# Patient Record
Sex: FEMALE | Race: WHITE | Employment: FULL TIME | URBAN - METROPOLITAN AREA
[De-identification: names, ages, dates, MRNs, and addresses within clinical notes are randomized per-mention and may not be internally consistent; named-entity substitution may affect disease eponyms.]

---

## 2017-01-02 ENCOUNTER — OFFICE VISIT (OUTPATIENT)
Dept: FAMILY MEDICINE CLINIC | Age: 57
End: 2017-01-02

## 2017-01-02 VITALS
TEMPERATURE: 99 F | DIASTOLIC BLOOD PRESSURE: 64 MMHG | SYSTOLIC BLOOD PRESSURE: 106 MMHG | OXYGEN SATURATION: 99 % | RESPIRATION RATE: 12 BRPM | WEIGHT: 117 LBS | HEIGHT: 60 IN | HEART RATE: 90 BPM | BODY MASS INDEX: 22.97 KG/M2

## 2017-01-02 DIAGNOSIS — J01.90 ACUTE SINUSITIS, RECURRENCE NOT SPECIFIED, UNSPECIFIED LOCATION: Primary | ICD-10-CM

## 2017-01-02 RX ORDER — DOXYCYCLINE 100 MG/1
100 CAPSULE ORAL 2 TIMES DAILY
Qty: 20 CAP | Refills: 0 | Status: SHIPPED | OUTPATIENT
Start: 2017-01-02 | End: 2017-01-12

## 2017-01-02 NOTE — MR AVS SNAPSHOT
Visit Information Date & Time Provider Department Dept. Phone Encounter #  
 1/2/2017 12:15 PM FAST 31 [facility] [place] [phone] [id number] Upcoming Health Maintenance Date Due Hepatitis C Screening 1960 COLONOSCOPY 10/14/1978 DTaP/Tdap/Td series (1 - Tdap) 10/14/1981 PAP AKA CERVICAL CYTOLOGY 10/14/1981 BREAST CANCER SCRN MAMMOGRAM 10/14/2010 Allergies as of 1/2/2017  Review Complete On: 1/2/2017 By: Garrison Nichols NP Severity Noted Reaction Type Reactions Ciprofloxacin  06/24/2011    Rash Iodinated Contrast Media - Oral And Iv Dye  03/25/2016    Hives Macrobid [Nitrofurantoin Monohyd/m-cryst]  01/08/2013    Other (comments) Nexium [Esomeprazole Magnesium]  10/07/2016    Other (comments) Urinary discomfort Nsaids (Non-steroidal Anti-inflammatory Drug)  01/08/2013    Other (comments) Patient states no allergy just not to take them due to elevated LFT's  
 Penicillins  10/04/2010    Anaphylaxis Skelaxin [Metaxalone]  10/04/2010    Hives Sulfa (Sulfonamide Antibiotics)  10/04/2010    Other (comments) Ultram [Tramadol]  01/08/2013    Rash Current Immunizations  Reviewed on 10/7/2016 Name Date Influenza Vaccine 10/4/2013, 1/18/2013 Influenza Vaccine (Quad) PF 10/7/2016  4:37 PM  
 Pneumococcal Conjugate (PCV-13) 10/7/2016  4:37 PM  
 Pneumococcal Polysaccharide (PPSV-23) 10/4/2013 Not reviewed this visit You Were Diagnosed With   
  
 Codes Comments Acute sinusitis, recurrence not specified, unspecified location    -  Primary ICD-10-CM: J01.90 ICD-9-CM: 461.9 Vitals BP Pulse Temp Resp Height(growth percentile) Weight(growth percentile) 106/64 90 99 °F (37.2 °C) (Oral) 12 5' (1.524 m) 117 lb (53.1 kg) SpO2 BMI OB Status Smoking Status 99% 22.85 kg/m2 Hysterectomy Current Every Day Smoker BMI and BSA Data Body Mass Index Body Surface Area 22.85 kg/m 2 1.5 m 2 Preferred Pharmacy Pharmacy Name Phone FARM [facility] [address] [facility], [address] [place] [address] [phone] Your Updated Medication List  
  
   
This list is accurate as of: 1/2/17 12:40 PM.  Always use your most recent med list.  
  
  
  
  
 albuterol 90 mcg/actuation inhaler Commonly known as:  PROVENTIL HFA, VENTOLIN HFA, PROAIR HFA Take 1 Puff by inhalation every four (4) hours as needed for Wheezing. buPROPion  mg tablet Commonly known as:  Ritika Bannister Take 1 Tab by mouth every morning. clobetasol 0.05 % topical cream  
Commonly known as:  Alfornia Hussar Apply  to affected area two (2) times a day. doxycycline 100 mg capsule Commonly known as:  VIBRAMYCIN Take 1 Cap by mouth two (2) times a day for 10 days. estradiol 1.5 mg tablet Commonly known as:  ESTRACE Take  by mouth daily. guaiFENesin-codeine 100-10 mg/5 mL solution Commonly known as:  CHERATUSSIN AC  
5-10 mL every 6 hours if needed for cough. HYDROcodone-acetaminophen 7.5-325 mg per tablet Commonly known as:  Lynnetta Ratliff Take 1 Tab by mouth every eight (8) hours as needed for Pain.  
  
 lovastatin 20 mg tablet Commonly known as:  MEVACOR  
take 1 tablet by mouth once daily  
  
 metoclopramide HCl 5 mg tablet Commonly known as:  REGLAN Take 1 Tab by mouth four (4) times daily. MILK THISTLE PO Take  by mouth. oxazepam 15 mg capsule Commonly known as:  SERAX Take 1 Cap by mouth three (3) times daily as needed for Anxiety. pantoprazole 40 mg tablet Commonly known as:  PROTONIX Take 1 Tab by mouth daily. TYLENOL EXTRA STRENGTH 500 mg tablet Generic drug:  acetaminophen Take 500 mg by mouth every six (6) hours as needed. VITAMIN D3 PO Take  by mouth. Prescriptions Sent to Pharmacy  Refills  
 doxycycline (VIBRAMYCIN) 100 mg capsule 0  
 Sig: Take 1 Cap by mouth two (2) times a day for 10 days. Class: Normal  
 Pharmacy: [address] [place] [address] [address] Ph #: [phone] Route: Oral  
  
Patient Instructions Sinusitis: Care Instructions Your Care Instructions Sinusitis is an infection of the lining of the sinus cavities in your head. Sinusitis often follows a cold. It causes pain and pressure in your head and face. In most cases, sinusitis gets better on its own in 1 to 2 weeks. But some mild symptoms may last for several weeks. Sometimes antibiotics are needed. Follow-up care is a key part of your treatment and safety. Be sure to make and go to all appointments, and call your doctor if you are having problems. It's also a good idea to know your test results and keep a list of the medicines you take. How can you care for yourself at home? · Take an over-the-counter pain medicine, such as acetaminophen (Tylenol), ibuprofen (Advil, Motrin), or naproxen (Aleve). Read and follow all instructions on the label. · If the doctor prescribed antibiotics, take them as directed. Do not stop taking them just because you feel better. You need to take the full course of antibiotics. · Be careful when taking over-the-counter cold or flu medicines and Tylenol at the same time. Many of these medicines have acetaminophen, which is Tylenol. Read the labels to make sure that you are not taking more than the recommended dose. Too much acetaminophen (Tylenol) can be harmful. · Breathe warm, moist air from a steamy shower, a hot bath, or a sink filled with hot water. Avoid cold, dry air. Using a humidifier in your home may help. Follow the directions for cleaning the machine. · Use saline (saltwater) nasal washes to help keep your nasal passages open and wash out mucus and bacteria. You can buy saline nose drops at a grocery store or drugstore.  Or you can make your own at home by adding 1 teaspoon of salt and 1 teaspoon of baking soda to 2 cups of distilled water. If you make your own, fill a bulb syringe with the solution, insert the tip into your nostril, and squeeze gently. Katelynn Bunting your nose. · Put a hot, wet towel or a warm gel pack on your face 3 or 4 times a day for 5 to 10 minutes each time. · Try a decongestant nasal spray like oxymetazoline (Afrin). Do not use it for more than 3 days in a row. Using it for more than 3 days can make your congestion worse. When should you call for help? Call your doctor now or seek immediate medical care if: 
· You have new or worse swelling or redness in your face or around your eyes. · You have a new or higher fever. Watch closely for changes in your health, and be sure to contact your doctor if: 
· You have new or worse facial pain. · The mucus from your nose becomes thicker (like pus) or has new blood in it. · You are not getting better as expected. Where can you learn more? Go to http://varun-lucia.info/. Enter U879 in the search box to learn more about \"Sinusitis: Care Instructions. \" Current as of: July 29, 2016 Content Version: 11.1 © 0283-7205 [employer]. Care instructions adapted under license by [employer] (which disclaims liability or warranty for this information). If you have questions about a medical condition or this instruction, always ask your healthcare professional. [place] [address] any warranty or liability for your use of this information. Saline Nasal Washes: Care Instructions Your Care Instructions Saline nasal washes help keep the nasal passages open by washing out thick or dried mucus. This simple remedy can help relieve symptoms of allergies, sinusitis, and colds.  It also can make the nose feel more comfortable by keeping the mucous membranes moist. You may notice a little burning sensation in your nose the first few times you use the solution, but this usually gets better in a few days. Follow-up care is a key part of your treatment and safety. Be sure to make and go to all appointments, and call your doctor if you are having problems. It's also a good idea to know your test results and keep a list of the medicines you take. How can you care for yourself at home? · You can buy premixed saline solution in a squeeze bottle or other sinus rinse products at a drugstore. Read and follow the instructions on the label. · You also can make your own saline solution by adding 1 teaspoon of salt and 1 teaspoon of baking soda to 2 cups of distilled water. · If you use a homemade solution, pour a small amount into a clean bowl. Using a rubber bulb syringe, squeeze the syringe and place the tip in the salt water. Pull a small amount of the salt water into the syringe by relaxing your hand. · Sit down with your head tilted slightly back. Do not lie down. Put the tip of the bulb syringe or the squeeze bottle a little way into one of your nostrils. Gently drip or squirt a few drops into the nostril. Repeat with the other nostril. Some sneezing and gagging are normal at first. 
· Gently blow your nose. · Wipe the syringe or bottle tip clean after each use. · Repeat this 2 or 3 times a day. · Use nasal washes gently if you have nosebleeds often. When should you call for help? Watch closely for changes in your health, and be sure to contact your doctor if: 
· You often get nosebleeds. · You have problems doing the nasal washes. Where can you learn more? Go to http://varun-lucia.info/. Enter [id number] in the search box to learn more about \"Saline Nasal Washes: Care Instructions. \" Current as of: July 29, 2016 Content Version: 11.1 © 8042-5775 [employer]. Care instructions adapted under license by TipRanks (which disclaims liability or warranty for this information).  If you have questions about a medical condition or this instruction, always ask your healthcare professional. [address] any warranty or liability for your use of this information. Introducing hospitals & HEALTH SERVICES! Dear Jairon Barajas: Thank you for requesting a [employer] account. Our records indicate that you already have an active [employer] account. You can access your account anytime at [email or web address] Did you know that you can access your hospital and ER discharge instructions at any time in [employer]? You can also review all of your test results from your hospital stay or ER visit. Additional Information If you have questions, please visit the Frequently Asked Questions section of the [employer] website at [email or web address] Remember, [employer] is NOT to be used for urgent needs. For medical emergencies, dial 911. Now available from your iPhone and Android! Please provide this summary of care documentation to your next provider. Your primary care clinician is listed as Savanna Lisa. If you have any questions after today's visit, please call [phone].

## 2017-01-02 NOTE — PATIENT INSTRUCTIONS
Sinusitis: Care Instructions  Your Care Instructions    Sinusitis is an infection of the lining of the sinus cavities in your head. Sinusitis often follows a cold. It causes pain and pressure in your head and face. In most cases, sinusitis gets better on its own in 1 to 2 weeks. But some mild symptoms may last for several weeks. Sometimes antibiotics are needed. Follow-up care is a key part of your treatment and safety. Be sure to make and go to all appointments, and call your doctor if you are having problems. It's also a good idea to know your test results and keep a list of the medicines you take. How can you care for yourself at home? · Take an over-the-counter pain medicine, such as acetaminophen (Tylenol), ibuprofen (Advil, Motrin), or naproxen (Aleve). Read and follow all instructions on the label. · If the doctor prescribed antibiotics, take them as directed. Do not stop taking them just because you feel better. You need to take the full course of antibiotics. · Be careful when taking over-the-counter cold or flu medicines and Tylenol at the same time. Many of these medicines have acetaminophen, which is Tylenol. Read the labels to make sure that you are not taking more than the recommended dose. Too much acetaminophen (Tylenol) can be harmful. · Breathe warm, moist air from a steamy shower, a hot bath, or a sink filled with hot water. Avoid cold, dry air. Using a humidifier in your home may help. Follow the directions for cleaning the machine. · Use saline (saltwater) nasal washes to help keep your nasal passages open and wash out mucus and bacteria. You can buy saline nose drops at a grocery store or drugstore. Or you can make your own at home by adding 1 teaspoon of salt and 1 teaspoon of baking soda to 2 cups of distilled water. If you make your own, fill a bulb syringe with the solution, insert the tip into your nostril, and squeeze gently. Alease Ruffini your nose.   · Put a hot, wet towel or a warm gel pack on your face 3 or 4 times a day for 5 to 10 minutes each time. · Try a decongestant nasal spray like oxymetazoline (Afrin). Do not use it for more than 3 days in a row. Using it for more than 3 days can make your congestion worse. When should you call for help? Call your doctor now or seek immediate medical care if:  · You have new or worse swelling or redness in your face or around your eyes. · You have a new or higher fever. Watch closely for changes in your health, and be sure to contact your doctor if:  · You have new or worse facial pain. · The mucus from your nose becomes thicker (like pus) or has new blood in it. · You are not getting better as expected. Where can you learn more? Go to http://varun-lucia.info/. Enter E838 in the search box to learn more about \"Sinusitis: Care Instructions. \"  Current as of: July 29, 2016  Content Version: 11.1  © 5811-1761 [employer]. Care instructions adapted under license by Bon[employer]ide (which disclaims liability or warranty for this information). If you have questions about a medical condition or this instruction, always ask your healthcare professional. [place] [address] any warranty or liability for your use of this information. Saline Nasal Washes: Care Instructions  Your Care Instructions  Saline nasal washes help keep the nasal passages open by washing out thick or dried mucus. This simple remedy can help relieve symptoms of allergies, sinusitis, and colds. It also can make the nose feel more comfortable by keeping the mucous membranes moist. You may notice a little burning sensation in your nose the first few times you use the solution, but this usually gets better in a few days. Follow-up care is a key part of your treatment and safety. Be sure to make and go to all appointments, and call your doctor if you are having problems.  It's also a good idea to know your test results and keep a list of the medicines you take. How can you care for yourself at home? · You can buy premixed saline solution in a squeeze bottle or other sinus rinse products at a drugstore. Read and follow the instructions on the label. · You also can make your own saline solution by adding 1 teaspoon of salt and 1 teaspoon of baking soda to 2 cups of distilled water. · If you use a homemade solution, pour a small amount into a clean bowl. Using a rubber bulb syringe, squeeze the syringe and place the tip in the salt water. Pull a small amount of the salt water into the syringe by relaxing your hand. · Sit down with your head tilted slightly back. Do not lie down. Put the tip of the bulb syringe or the squeeze bottle a little way into one of your nostrils. Gently drip or squirt a few drops into the nostril. Repeat with the other nostril. Some sneezing and gagging are normal at first.  · Gently blow your nose. · Wipe the syringe or bottle tip clean after each use. · Repeat this 2 or 3 times a day. · Use nasal washes gently if you have nosebleeds often. When should you call for help? Watch closely for changes in your health, and be sure to contact your doctor if:  · You often get nosebleeds. · You have problems doing the nasal washes. Where can you learn more? Go to http://varun-lucia.info/. Enter [id number] [id number] [phone] in the search box to learn more about \"Saline Nasal Washes: Care Instructions. \"  Current as of: July 29, 2016  Content Version: 11.1  © 4346-8979 [employer]. Care instructions adapted under license by [employer] (which disclaims liability or warranty for this information). If you have questions about a medical condition or this instruction, always ask your healthcare professional. [address] any warranty or liability for your use of this information.

## 2017-01-02 NOTE — PROGRESS NOTES
HISTORY OF PRESENT ILLNESS  Heidi Morejon is a 64 y.o. female with complaints of nasal congestion, headache, facial pain, post nasal drainage, and sore throat for the past eight days. Sinus Pain    The history is provided by the patient. This is a new problem. The current episode started more than 1 week ago. The problem has not changed since onset. Patient reports a subjective fever - was not measured. The fever has been present for 1 - 2 days. The pain is moderate. The pain has been fluctuating since onset. Associated symptoms include congestion, sinus pressure, sore throat, cough and headaches. She has tried drinking for the symptoms. The treatment provided no relief. Review of Systems   Constitutional: Positive for fever. HENT: Positive for congestion, sinus pressure and sore throat. Eyes: Negative. Respiratory: Positive for cough. Cardiovascular: Negative. Gastrointestinal: Negative. Genitourinary: Negative. Musculoskeletal: Negative. Skin: Negative. Neurological: Positive for headaches. Endo/Heme/Allergies: Negative. Psychiatric/Behavioral: Negative. Physical Exam   Constitutional: She is oriented to person, place, and time. She appears well-developed and well-nourished. No distress. HENT:   Head: Normocephalic and atraumatic. Right Ear: External ear normal.   Left Ear: External ear normal.   Mouth/Throat: No oropharyngeal exudate. Oropharynx erythematous,  Post nasal drainage, Tonsils 2+  without exudates. Nasal turbinates enlarged with clear drainage, positive for facial tenderness   Eyes: EOM are normal. Pupils are equal, round, and reactive to light. Neck: Normal range of motion. Neck supple. Cardiovascular: Normal rate, regular rhythm and normal heart sounds. Exam reveals no gallop and no friction rub. No murmur heard. Pulmonary/Chest: Effort normal and breath sounds normal. No respiratory distress. Musculoskeletal: Normal range of motion. Lymphadenopathy:     She has cervical adenopathy. Neurological: She is alert and oriented to person, place, and time. Skin: Skin is warm and dry. She is not diaphoretic. Psychiatric: She has a normal mood and affect. Her behavior is normal. Judgment and thought content normal.       ASSESSMENT and PLAN  1. Acute sinusitis, recurrence not specified, unspecified location  Orders Placed This Encounter    doxycycline (VIBRAMYCIN) 100 mg capsule     Sig: Take 1 Cap by mouth two (2) times a day for 10 days. Dispense:  20 Cap     Refill:  0   Follow up with  Your primary care provider or urgent care  if symptoms worsens or fail to improve within the next three to four days.

## 2017-04-10 RX ORDER — OXAZEPAM 15 MG/1
15 CAPSULE ORAL
Qty: 60 CAP | Refills: 2 | OUTPATIENT
Start: 2017-04-10 | End: 2017-08-08 | Stop reason: SDUPTHER

## 2017-04-10 NOTE — TELEPHONE ENCOUNTER
From: Shala Hamm  To: Merlyn Shrestha MD  Sent: 4/9/2017 11:44 AM EDT  Subject: Medication Renewal Request    Original authorizing provider: MD Shala Blancas would like a refill of the following medications:  oxazepam (SERAX) 15 mg capsule Merlyn Shrestha MD]    Preferred pharmacy: [address]     Comment:

## 2017-08-09 RX ORDER — BUPROPION HYDROCHLORIDE 150 MG/1
150 TABLET ORAL
Qty: 30 TAB | Refills: 0 | Status: SHIPPED | OUTPATIENT
Start: 2017-08-09 | End: 2022-04-13

## 2017-08-09 RX ORDER — OXAZEPAM 15 MG/1
15 CAPSULE ORAL
Qty: 60 CAP | Refills: 0 | OUTPATIENT
Start: 2017-08-09 | End: 2017-10-06 | Stop reason: SDUPTHER

## 2017-08-09 NOTE — TELEPHONE ENCOUNTER
From: Erna Palmer  To: Halina Tolbert MD  Sent: 8/8/2017 9:59 PM EDT  Subject: Medication Renewal Request    Original authorizing provider: MD Erna Kimbrough would like a refill of the following medications:  buPROPion XL (WELLBUTRIN XL) 150 mg tablet Halina Tolbert MD]  oxazepam (SERAX) 15 mg capsule Halina Tolbert MD]    Preferred pharmacy: [place] [address] [id number] [address] [place] [address],[address].     Comment:

## 2017-09-08 RX ORDER — GRANULES FOR ORAL 3 G/1
3 POWDER ORAL ONCE
Qty: 1 PACKET | Refills: 0 | Status: SHIPPED | OUTPATIENT
Start: 2017-09-08 | End: 2017-09-08

## 2017-10-11 ENCOUNTER — OFFICE VISIT (OUTPATIENT)
Dept: FAMILY MEDICINE CLINIC | Age: 57
End: 2017-10-11

## 2017-10-11 VITALS
TEMPERATURE: 97.4 F | WEIGHT: 118 LBS | DIASTOLIC BLOOD PRESSURE: 67 MMHG | OXYGEN SATURATION: 99 % | BODY MASS INDEX: 23.16 KG/M2 | SYSTOLIC BLOOD PRESSURE: 116 MMHG | HEIGHT: 60 IN | RESPIRATION RATE: 22 BRPM

## 2017-10-11 DIAGNOSIS — E78.5 HYPERLIPIDEMIA, UNSPECIFIED HYPERLIPIDEMIA TYPE: Primary | ICD-10-CM

## 2017-10-11 DIAGNOSIS — F41.9 CHRONIC ANXIETY: ICD-10-CM

## 2017-10-11 DIAGNOSIS — K21.9 GASTROESOPHAGEAL REFLUX DISEASE WITHOUT ESOPHAGITIS: ICD-10-CM

## 2017-10-11 DIAGNOSIS — Z23 ENCOUNTER FOR IMMUNIZATION: ICD-10-CM

## 2017-10-11 RX ORDER — OXAZEPAM 15 MG/1
15 CAPSULE ORAL
Qty: 90 CAP | Refills: 2 | Status: SHIPPED | OUTPATIENT
Start: 2017-10-11 | End: 2018-02-04 | Stop reason: SDUPTHER

## 2017-10-11 NOTE — PROGRESS NOTES
HISTORY OF PRESENT ILLNESS  Basil Silva is a 64 y.o. female. HPI  gerd stable  Anxiety stable  Abnormal LFTS under gi surveillance  Review of Systems   Gastrointestinal: Positive for heartburn. Psychiatric/Behavioral: The patient is nervous/anxious. All other systems reviewed and are negative. Past Medical History:   Diagnosis Date    Asthma     seasonal    Fibromyalgia     GERD (gastroesophageal reflux disease)     Hematuria     Hypercholesteremia     Hypercholesterolemia     Ill-defined condition     migraines    Kidney stone     Nausea & vomiting     Pyelonephritis     Rubella     Spinal stenosis     Urinary retention     Urine leukocytes     UTI (urinary tract infection)      Current Outpatient Prescriptions on File Prior to Visit   Medication Sig Dispense Refill    oxazepam (SERAX) 15 mg capsule take 1 capsule by mouth three times a day if needed for anxiety 60 Cap 0    buPROPion XL (WELLBUTRIN XL) 150 mg tablet Take 1 Tab by mouth every morning. 30 Tab 0    metoclopramide HCl (REGLAN) 5 mg tablet Take 1 Tab by mouth four (4) times daily. 120 Tab 0    lovastatin (MEVACOR) 20 mg tablet take 1 tablet by mouth once daily 90 Tab 3    pantoprazole (PROTONIX) 40 mg tablet Take 1 Tab by mouth daily. 90 Tab 1    albuterol (PROVENTIL HFA, VENTOLIN HFA, PROAIR HFA) 90 mcg/actuation inhaler Take 1 Puff by inhalation every four (4) hours as needed for Wheezing. 1 Inhaler 3    clobetasol (TEMOVATE) 0.05 % topical cream Apply  to affected area two (2) times a day. 60 g 3    CHOLECALCIFEROL, VITAMIN D3, (VITAMIN D3 PO) Take  by mouth.  HYDROcodone-acetaminophen (NORCO) 7.5-325 mg per tablet Take 1 Tab by mouth every eight (8) hours as needed for Pain. 14 Tab 0    acetaminophen (TYLENOL EXTRA STRENGTH) 500 mg tablet Take 500 mg by mouth every six (6) hours as needed.  estradiol (ESTRACE) 1.5 mg tablet Take  by mouth daily.  MILK THISTLE PO Take  by mouth.        No current facility-administered medications on file prior to visit. Visit Vitals    /67 (BP 1 Location: Right arm, BP Patient Position: Sitting)    Temp 97.4 °F (36.3 °C) (Oral)    Resp 22    Ht 5' (1.524 m)    Wt 118 lb (53.5 kg)    SpO2 99%    BMI 23.05 kg/m2         Physical Exam   Constitutional: She appears well-developed and well-nourished. No distress. Skin: She is not diaphoretic. Psychiatric: She has a normal mood and affect. Her behavior is normal. Judgment and thought content normal.   Vitals reviewed.   reviewed gi w/u  Us abd neg    ASSESSMENT and PLAN  prateek Morales in 3-6 months

## 2017-10-11 NOTE — MR AVS SNAPSHOT
Visit Information Date & Time Provider Department Dept. Phone Encounter #  
 10/11/2017  2:15 PM Bertin Rios, [address] [phone] [id number] Follow-up Instructions Return in about 6 months (around 4/11/2018). Upcoming Health Maintenance Date Due Hepatitis C Screening 1960 COLONOSCOPY 10/14/1978 DTaP/Tdap/Td series (1 - Tdap) 10/14/1981 PAP AKA CERVICAL CYTOLOGY 10/14/1981 BREAST CANCER SCRN MAMMOGRAM 10/14/2010 INFLUENZA AGE 9 TO ADULT 8/1/2017 Allergies as of 10/11/2017  Review Complete On: 10/11/2017 By: Bertin Rios MD  
  
 Severity Noted Reaction Type Reactions Ciprofloxacin  06/24/2011    Rash Iodinated Contrast- Oral And Iv Dye  03/25/2016    Hives Macrobid [Nitrofurantoin Monohyd/m-cryst]  01/08/2013    Other (comments) Nexium [Esomeprazole Magnesium]  10/07/2016    Other (comments) Urinary discomfort Nsaids (Non-steroidal Anti-inflammatory Drug)  01/08/2013    Other (comments) Patient states no allergy just not to take them due to elevated LFT's  
 Penicillins  10/04/2010    Anaphylaxis Skelaxin [Metaxalone]  10/04/2010    Hives Sulfa (Sulfonamide Antibiotics)  10/04/2010    Other (comments) Ultram [Tramadol]  01/08/2013    Rash Current Immunizations  Reviewed on 10/7/2016 Name Date Influenza Vaccine 10/4/2013, 1/18/2013 Influenza Vaccine (Quad) PF  Incomplete, 10/7/2016  4:37 PM  
 Pneumococcal Conjugate (PCV-13) 10/7/2016  4:37 PM  
 Pneumococcal Polysaccharide (PPSV-23) 10/4/2013 Not reviewed this visit You Were Diagnosed With   
  
 Codes Comments Hyperlipidemia, unspecified hyperlipidemia type    -  Primary ICD-10-CM: E78.5 ICD-9-CM: 272.4 Encounter for immunization     ICD-10-CM: Y94 ICD-9-CM: V03.89 Gastroesophageal reflux disease without esophagitis     ICD-10-CM: K21.9 ICD-9-CM: 530.81 Chronic anxiety     ICD-10-CM: F41.9 ICD-9-CM: 300.00 Vitals BP Temp Resp Height(growth percentile) Weight(growth percentile) SpO2  
 116/67 (BP 1 Location: Right arm, BP Patient Position: Sitting) 97.4 °F (36.3 °C) (Oral) 22 5' (1.524 m) 118 lb (53.5 kg) 99% BMI OB Status Smoking Status 23.05 kg/m2 Hysterectomy Current Every Day Smoker BMI and BSA Data Body Mass Index Body Surface Area 23.05 kg/m 2 1.5 m 2 Preferred Pharmacy Pharmacy Name Phone [address] [place], [address] [facility]. [phone] Your Updated Medication List  
  
   
This list is accurate as of: 10/11/17  3:01 PM.  Always use your most recent med list.  
  
  
  
  
 albuterol 90 mcg/actuation inhaler Commonly known as:  PROVENTIL HFA, VENTOLIN HFA, PROAIR HFA Take 1 Puff by inhalation every four (4) hours as needed for Wheezing. buPROPion  mg tablet Commonly known as:  Zohaib Geovany Take 1 Tab by mouth every morning. clobetasol 0.05 % topical cream  
Commonly known as:  Nadya Plume Apply  to affected area two (2) times a day. estradiol 1.5 mg tablet Commonly known as:  ESTRACE Take  by mouth daily. HYDROcodone-acetaminophen 7.5-325 mg per tablet Commonly known as:  Loretta President Take 1 Tab by mouth every eight (8) hours as needed for Pain.  
  
 lovastatin 20 mg tablet Commonly known as:  MEVACOR  
take 1 tablet by mouth once daily  
  
 metoclopramide HCl 5 mg tablet Commonly known as:  REGLAN Take 1 Tab by mouth four (4) times daily. MILK THISTLE PO Take  by mouth. oxazepam 15 mg capsule Commonly known as:  SERAX Take 1 Cap by mouth three (3) times daily as needed for Sleep or Anxiety. Max Daily Amount: 45 mg.  
  
 pantoprazole 40 mg tablet Commonly known as:  PROTONIX Take 1 Tab by mouth daily. TYLENOL EXTRA STRENGTH 500 mg tablet Generic drug:  acetaminophen Take 500 mg by mouth every six (6) hours as needed. VITAMIN D3 PO Take  by mouth. Prescriptions Printed Refills  
 oxazepam (SERAX) 15 mg capsule 2 Sig: Take 1 Cap by mouth three (3) times daily as needed for Sleep or Anxiety. Max Daily Amount: 45 mg.  
 Class: Print Route: Oral  
  
We Performed the Following INFLUENZA VIRUS VAC QUAD,SPLIT,PRESV FREE SYRINGE IM [id number] CPT(R)] LIPID PANEL [87908 CPT(R)] Follow-up Instructions Return in about 6 months (around 4/11/2018). To-Do List   
 10/11/2017 Lab:  LIPID PANEL Introducing [facility] & [facility] SERVICES! Dear Ellen Mejia: Thank you for requesting a [facility] [employer] account. Our records indicate that you already have an active [facility] [employer] account. You can access your account anytime at [email or web address] Did you know that you can access your hospital and ER discharge instructions at any time in [facility] [facility]? You can also review all of your test results from your hospital stay or ER visit. Additional Information If you have questions, please visit the Frequently Asked Questions section of the [employer] website at [email or web address] Remember, [facility] is NOT to be used for urgent needs. For medical emergencies, dial 911. Now available from your iPhone and Android! Please provide this summary of care documentation to your next provider. Your primary care clinician is listed as Artur Greco. If you have any questions after today's visit, please call [phone].

## 2017-10-11 NOTE — LETTER
10/12/2017 2:35 PM 
 
Ms. Ata Glover [address] [address] Dear Ata Glover: Please find your most recent results below. Resulted Orders LIPID PANEL Result Value Ref Range Triglyceride 96 40 - 149 mg/dL HDL Cholesterol 61 (H) 40 - 59 mg/dL Cholesterol, total 176 110 - 200 mg/dL LDL, calculated 96 50 - 99 mg/dL VLDL, calculated 19 8 - 30 mg/dL Comment:  
   Test includes cholesterol, HDL cholesterol, triglycerides and LDL. Cholesterol Recommended NCEP guidelines in mg/dL: 
Less than 200      Desirable 200 - 239          Borderline High Greater than or  = to 240   High 
  
 Narrative Unless additionally indicated, test performed at: [address], [address] [place] [address]. PH: [phone]. RECOMMENDATIONS: 
labs very good Please call me if you have any questions: [phone] Sincerely, Yaneli He MD

## 2017-10-11 NOTE — PROGRESS NOTES
Basil Silva is a 64 y.o. female (: 1960) presenting to address:    Chief Complaint   Patient presents with    Depression    Medication Refill     pain scale 0/10       Vitals:    10/11/17 1424   BP: 116/67   Resp: 22   Temp: 97.4 °F (36.3 °C)   TempSrc: Oral   SpO2: 99%   Weight: 118 lb (53.5 kg)   Height: 5' (1.524 m)   PainSc:   8   PainLoc: Head       Hearing/Vision:   No exam data present    Learning Assessment:     Learning Assessment 2014   PRIMARY LEARNER Patient   HIGHEST LEVEL OF EDUCATION - PRIMARY LEARNER  SOME COLLEGE   BARRIERS PRIMARY LEARNER NONE   PRIMARY LANGUAGE ENGLISH   LEARNER PREFERENCE PRIMARY DEMONSTRATION   ANSWERED BY patient   RELATIONSHIP SELF     Depression Screening:     PHQ over the last two weeks 10/11/2017   Little interest or pleasure in doing things Not at all   Feeling down, depressed or hopeless Not at all   Total Score PHQ 2 0     Fall Risk Assessment:   No flowsheet data found. Abuse Screening:     Abuse Screening Questionnaire 2014   Do you ever feel afraid of your partner? N   Are you in a relationship with someone who physically or mentally threatens you? N   Is it safe for you to go home? Y     Coordination of Care Questionaire:   1. Have you been to the ER, urgent care clinic since your last visit? Hospitalized since your last visit? NO    2. Have you seen or consulted any other health care providers outside of the [address] [place] [address] since your last visit? Include any pap smears or colon screening. YES    Advanced Directive:   1. Do you have an Advanced Directive? NO    2. Would you like information on Advanced Directives?  NO

## 2017-10-12 LAB
CHOLEST SERPL-MCNC: 176 MG/DL (ref 110–200)
HDLC SERPL-MCNC: 61 MG/DL (ref 40–59)
LDLC SERPL CALC-MCNC: 96 MG/DL (ref 50–99)
TRIGL SERPL-MCNC: 96 MG/DL (ref 40–149)
VLDLC SERPL CALC-MCNC: 19 MG/DL (ref 8–30)

## 2017-11-03 RX ORDER — LOVASTATIN 20 MG/1
TABLET ORAL
Qty: 90 TAB | Refills: 3 | Status: SHIPPED | OUTPATIENT
Start: 2017-11-03

## 2018-02-23 ENCOUNTER — CLINICAL SUPPORT (OUTPATIENT)
Dept: FAMILY MEDICINE CLINIC | Age: 58
End: 2018-02-23

## 2018-02-23 DIAGNOSIS — R30.0 DYSURIA: Primary | ICD-10-CM

## 2018-02-23 LAB
BILIRUB UR QL STRIP: NEGATIVE
GLUCOSE UR-MCNC: NEGATIVE MG/DL
KETONES P FAST UR STRIP-MCNC: NEGATIVE MG/DL
PH UR STRIP: 6 [PH] (ref 4.6–8)
PROT UR QL STRIP: NEGATIVE
SP GR UR STRIP: 1.01 (ref 1–1.03)
UA UROBILINOGEN AMB POC: NORMAL (ref 0.2–1)
URINALYSIS CLARITY POC: CLEAR
URINALYSIS COLOR POC: YELLOW
URINE BLOOD POC: NORMAL
URINE LEUKOCYTES POC: NORMAL
URINE NITRITES POC: NEGATIVE

## 2018-02-23 RX ORDER — GRANULES FOR ORAL 3 G/1
3 POWDER ORAL ONCE
Qty: 1 PACKET | Refills: 0 | Status: SHIPPED | OUTPATIENT
Start: 2018-02-23 | End: 2018-02-23

## 2018-05-04 PROBLEM — K80.50 CHOLEDOCHOLITHIASIS: Status: ACTIVE | Noted: 2018-05-04

## 2018-05-04 PROBLEM — R10.13 EPIGASTRIC PAIN: Status: ACTIVE | Noted: 2018-05-04

## 2018-05-09 ENCOUNTER — OFFICE VISIT (OUTPATIENT)
Dept: FAMILY MEDICINE CLINIC | Age: 58
End: 2018-05-09

## 2018-05-09 VITALS
WEIGHT: 120 LBS | HEART RATE: 87 BPM | RESPIRATION RATE: 18 BRPM | DIASTOLIC BLOOD PRESSURE: 60 MMHG | TEMPERATURE: 98.8 F | HEIGHT: 63 IN | SYSTOLIC BLOOD PRESSURE: 94 MMHG | OXYGEN SATURATION: 99 % | BODY MASS INDEX: 21.26 KG/M2

## 2018-05-09 DIAGNOSIS — K82.8 BILIARY DYSKINESIA: ICD-10-CM

## 2018-05-09 DIAGNOSIS — F41.9 CHRONIC ANXIETY: Primary | ICD-10-CM

## 2018-05-09 NOTE — PROGRESS NOTES
HISTORY OF PRESENT ILLNESS  Doug Valladares is a 62 y.o. female. HPI   recent gi w/u with  Biliary stones, etc  Review of Systems   Gastrointestinal: Positive for abdominal pain. All other systems reviewed and are negative. Past Medical History:   Diagnosis Date    Abdominal pain     Asthma     seasonal    Fibromyalgia     GERD (gastroesophageal reflux disease)     Hearing loss in left ear     Hematuria     Hypercholesteremia     Hypercholesterolemia     Ill-defined condition     migraines    Ill-defined condition     WRINKLE IN LEFT RETINA    Kidney stone     Nausea & vomiting     Psychiatric disorder     DEPRESSION    Pyelonephritis     Rubella     Spinal stenosis     Urinary retention     Urine leukocytes     UTI (urinary tract infection)        Current Outpatient Prescriptions:     Lactobacillus rhamnosus GG (CULTURELLE PO), Take 1 Cap by mouth daily. , Disp: , Rfl:     Biotin 2,500 mcg cap, Take 1 Cap by mouth daily. , Disp: , Rfl:     albuterol (PROVENTIL HFA, VENTOLIN HFA, PROAIR HFA) 90 mcg/actuation inhaler, Take 1 Puff by inhalation every four (4) hours as needed for Wheezing., Disp: 1 Inhaler, Rfl: 3    oxazepam (SERAX) 15 mg capsule, Take 1 Cap by mouth three (3) times daily as needed for Sleep or Anxiety. Max Daily Amount: 45 mg., Disp: 90 Cap, Rfl: 2    lovastatin (MEVACOR) 20 mg tablet, take 1 tablet by mouth once daily, Disp: 90 Tab, Rfl: 3    buPROPion XL (WELLBUTRIN XL) 150 mg tablet, Take 1 Tab by mouth every morning., Disp: 30 Tab, Rfl: 0    pantoprazole (PROTONIX) 40 mg tablet, Take 1 Tab by mouth daily. , Disp: 90 Tab, Rfl: 1    clobetasol (TEMOVATE) 0.05 % topical cream, Apply  to affected area two (2) times a day., Disp: 60 g, Rfl: 3    HYDROcodone-acetaminophen (NORCO) 7.5-325 mg per tablet, Take 1 Tab by mouth every eight (8) hours as needed for Pain., Disp: 14 Tab, Rfl: 0    acetaminophen (TYLENOL EXTRA STRENGTH) 500 mg tablet, Take 500 mg by mouth every six (6) hours as needed. , Disp: , Rfl:     estradiol (ESTRACE) 1.5 mg tablet, Take  by mouth every other day., Disp: , Rfl:   Visit Vitals    BP 94/60 (BP 1 Location: Right arm, BP Patient Position: Sitting)    Pulse 87    Temp 98.8 °F (37.1 °C) (Oral)    Resp 18    Ht 5' 2.5\" (1.588 m)    Wt 120 lb (54.4 kg)    SpO2 99%    BMI 21.6 kg/m2         Physical Exam   Constitutional: She appears well-developed and well-nourished. No distress. Cardiovascular: Normal rate, regular rhythm, normal heart sounds and intact distal pulses. Exam reveals no gallop and no friction rub. No murmur heard. Skin: She is not diaphoretic. Psychiatric: She has a normal mood and affect. Her behavior is normal. Judgment and thought content normal.   Vitals reviewed.     Reviewed GI w/u  ASSESSMENT and PLAN  biliary dyskinesia   Plan

## 2018-05-09 NOTE — PROGRESS NOTES
Stella Gutierrez is a 62 y.o. female (: 1960) presenting to address:    Chief Complaint   Patient presents with    Surgical Follow-up     patient stated pain where she had procedure    pain scale 4/10       Vitals:    18 1545   BP: 94/60   Pulse: 87   Resp: 18   Temp: 98.8 °F (37.1 °C)   TempSrc: Oral   SpO2: 99%   Weight: 120 lb (54.4 kg)   Height: 5' 2.5\" (1.588 m)   PainSc:   4   PainLoc: Abdomen       Hearing/Vision:   No exam data present    Learning Assessment:     Learning Assessment 2014   PRIMARY LEARNER Patient   HIGHEST LEVEL OF EDUCATION - PRIMARY LEARNER  SOME COLLEGE   BARRIERS PRIMARY LEARNER NONE   PRIMARY LANGUAGE ENGLISH   LEARNER PREFERENCE PRIMARY DEMONSTRATION   ANSWERED BY patient   RELATIONSHIP SELF     Depression Screening:     PHQ over the last two weeks 2018   PHQ Not Done Active Diagnosis of Depression or Bipolar Disorder   Little interest or pleasure in doing things -   Feeling down, depressed or hopeless -   Total Score PHQ 2 -     Fall Risk Assessment:   No flowsheet data found. Abuse Screening:     Abuse Screening Questionnaire 2014   Do you ever feel afraid of your partner? N   Are you in a relationship with someone who physically or mentally threatens you? N   Is it safe for you to go home? Y     Coordination of Care Questionaire:   1. Have you been to the ER, urgent care clinic since your last visit? Hospitalized since your last visit? NO    2. Have you seen or consulted any other health care providers outside of the [address] [place] [address] since your last visit? Include any pap smears or colon screening. Yes [facility]      Advanced Directive:   1. Do you have an Advanced Directive? NO    2. Would you like information on Advanced Directives?  NO

## 2018-05-09 NOTE — MR AVS SNAPSHOT
[address] [address] [place]  [address] [facility] [address] 
[phone] Patient: Devin Abdalla MRN: [id number] :1960 Visit Information Date & Time Provider Department Dept. Phone Encounter #  
 2018  3:30 PM Mendel Requena, MD [address] [place] [address] [phone] [id number] Follow-up Instructions Return if symptoms worsen or fail to improve. Follow-up and Disposition History Upcoming Health Maintenance Date Due Hepatitis C Screening 1960 COLONOSCOPY 10/14/1978 DTaP/Tdap/Td series (1 - Tdap) 10/14/1981 PAP AKA CERVICAL CYTOLOGY 10/14/1981 BREAST CANCER SCRN MAMMOGRAM 10/14/2010 Influenza Age 5 to Adult 2018 Allergies as of 2018  Review Complete On: 2018 By: Mendel Requena, MD  
  
 Severity Noted Reaction Type Reactions Ciprofloxacin  2011    Rash Iodinated Contrast- Oral And Iv Dye  2016    Hives Macrobid [Nitrofurantoin Monohyd/m-cryst]  2013    Other (comments) Nexium [Esomeprazole Magnesium]  10/07/2016    Other (comments) Urinary discomfort Nsaids (Non-steroidal Anti-inflammatory Drug)  2013    Other (comments) Patient states no allergy just not to take them due to elevated LFT's  
 Penicillins  10/04/2010    Anaphylaxis Skelaxin [Metaxalone]  10/04/2010    Hives Sulfa (Sulfonamide Antibiotics)  10/04/2010    Other (comments) Ultram [Tramadol]  2013    Rash Current Immunizations  Reviewed on 2018 Name Date Influenza Vaccine 10/1/2017, 10/4/2013, 2013 Influenza Vaccine (Quad) PF 10/11/2017  3:05 PM, 10/7/2016  4:37 PM  
 Pneumococcal Conjugate (PCV-13) 10/7/2016  4:37 PM  
 Pneumococcal Polysaccharide (PPSV-23) 10/4/2013 Pneumococcal Vaccine (Unspecified Type) 10/1/2017 Not reviewed this visit You Were Diagnosed With   
  
 Codes Comments Chronic anxiety    -  Primary ICD-10-CM: F41.9 ICD-9-CM: 300.00 Biliary dyskinesia     ICD-10-CM: K82.8 ICD-9-CM: 575.8 Vitals BP Pulse Temp Resp Height(growth percentile) Weight(growth percentile) 94/60 (BP 1 Location: Right arm, BP Patient Position: Sitting) 87 98.8 °F (37.1 °C) (Oral) 18 5' 2.5\" (1.588 m) 120 lb (54.4 kg) SpO2 BMI OB Status Smoking Status 99% 21.6 kg/m2 Hysterectomy Current Every Day Smoker Vitals History BMI and BSA Data Body Mass Index Body Surface Area  
 21.6 kg/m 2 1.55 m 2 Preferred Pharmacy Pharmacy Name Phone [address] [place], [address] [facility]. [phone] Your Updated Medication List  
  
   
This list is accurate as of 5/9/18  4:13 PM.  Always use your most recent med list.  
  
  
  
  
 albuterol 90 mcg/actuation inhaler Commonly known as:  PROVENTIL HFA, VENTOLIN HFA, PROAIR HFA Take 1 Puff by inhalation every four (4) hours as needed for Wheezing. Biotin 2,500 mcg Cap Take 1 Cap by mouth daily. buPROPion  mg tablet Commonly known as:  Theodor Care Take 1 Tab by mouth every morning. clobetasol 0.05 % topical cream  
Commonly known as:  Carlena Kenning Apply  to affected area two (2) times a day. CULTURELLE PO Take 1 Cap by mouth daily. estradiol 1.5 mg tablet Commonly known as:  ESTRACE Take  by mouth every other day. HYDROcodone-acetaminophen 7.5-325 mg per tablet Commonly known as:  Kellie Hamshire Take 1 Tab by mouth every eight (8) hours as needed for Pain.  
  
 lovastatin 20 mg tablet Commonly known as:  MEVACOR  
take 1 tablet by mouth once daily  
  
 oxazepam 15 mg capsule Commonly known as:  SERAX Take 1 Cap by mouth three (3) times daily as needed for Sleep or Anxiety. Max Daily Amount: 45 mg.  
  
 pantoprazole 40 mg tablet Commonly known as:  PROTONIX Take 1 Tab by mouth daily. TYLENOL EXTRA STRENGTH 500 mg tablet Generic drug:  acetaminophen Take 500 mg by mouth every six (6) hours as needed. Follow-up Instructions Return if symptoms worsen or fail to improve. Introducing [facility] & HEALTH SERVICES! Dear Aguilar Ch: Thank you for requesting a [employer] account. Our records indicate that you have previously registered for a [employer] account but its currently inactive. Please call our [employer] support line at [phone]. Additional Information If you have questions, please visit the Frequently Asked Questions section of the [employer] website at [email or web address] Remember, [employer] is NOT to be used for urgent needs. For medical emergencies, dial 911. Now available from your iPhone and Android! Please provide this summary of care documentation to your next provider. Your primary care clinician is listed as Ric Arguelles. If you have any questions after today's visit, please call [phone].

## 2018-07-09 ENCOUNTER — TELEPHONE (OUTPATIENT)
Dept: FAMILY MEDICINE CLINIC | Age: 58
End: 2018-07-09

## 2018-07-14 ENCOUNTER — TELEPHONE (OUTPATIENT)
Dept: FAMILY MEDICINE CLINIC | Age: 58
End: 2018-07-14

## 2018-07-17 NOTE — TELEPHONE ENCOUNTER
Call placed to patient, gave resulted note, patient accepting. Pt states she is under pain mgmt already, Dr. Oren Roman.

## 2018-11-26 RX ORDER — LOVASTATIN 20 MG/1
TABLET ORAL
Qty: 90 TAB | Refills: 3 | OUTPATIENT
Start: 2018-11-26

## 2018-11-26 NOTE — TELEPHONE ENCOUNTER
No further refills will be authorized. Dr. Brenda Koehler no longer at this practice. Pt needs to establish care with new PCP.

## 2022-04-13 PROBLEM — K85.90 ACUTE PANCREATITIS: Status: ACTIVE | Noted: 2022-04-13

## 2022-04-13 PROBLEM — K85.90 PANCREATITIS: Status: ACTIVE | Noted: 2022-04-13
